# Patient Record
(demographics unavailable — no encounter records)

---

## 2024-11-13 NOTE — DISCUSSION/SUMMARY
[de-identified] : Left knee mild osteoarthritis history of meniscus tear 4 years status post arthroscopy with worsening of symptoms he will obtain an MRI to further assess he is in extensive conservative care with physical therapy and anti-inflammatory medications for greater than 6 weeks he will follow-up after the MRI

## 2024-11-13 NOTE — PHYSICAL EXAM
[de-identified] : left knee exam  Skin: Clean, dry, intact Inspection: No obvious malalignment, no masses, no swelling, no effusion. Tenderness: + MJLT. No LJLT. No tenderness over the medial and lateral patella facets. No ttp medial/lateral epicondyle, patella tendon, tibial tubercle, pes anserinus, or proximal fibula. ROM: 0 to 130  pain with deep flexion Stability: Stable to varus, valgus, lachman testing. Negative anterior/posterior drawer. Additional tests: Negative McMurrays test, Negative patellar grind test.  Strength: 5/5 Q/H/TA/GS/EHL, no atrophy Neuro: In tact to light touch throughout in dp/sp/tib/rita/saph nerve districutions, DTR's normal Pulses: 2+ DP/PT pulses. [de-identified] : The following radiographs were ordered and read by me during this patients visit. I reviewed each radiograph in detail with the patient and discussed the findings as highlighted below.   4 views left knee were obtained today moderate arthrosis with medial joint space narrowing unchanged from prior radiographs

## 2024-11-13 NOTE — HISTORY OF PRESENT ILLNESS
[de-identified] : 54yo M s/p Left knee arthroscopy, partial medial meniscectomy, 8/23/21.  About 2 to 3 months ago start develop pain in his knee after he had a run.  He has not ran since.  Swelled up.  No specific injury.  Reports some pain medial anterior aspect of the knee worse with stairs bending.  Tried to rest, using ice.  He is doing home exercises.  Denies numbness tingling

## 2024-12-31 NOTE — HISTORY OF PRESENT ILLNESS
[de-identified] : 54yo M s/p Left knee arthroscopy, partial medial meniscectomy, 8/23/21.  About 2 to 3 months ago start develop pain in his knee after he had a run.  He has not ran since.  Swelled up.  No specific injury.  Reports some pain medial anterior aspect of the knee worse with stairs bending.  Tried to rest, using ice.  He is doing home exercises.  Denies numbness tingling

## 2024-12-31 NOTE — DISCUSSION/SUMMARY
[de-identified] : Left knee mild osteoarthritis, medial meniscus tear and focal synovitis.  I discussed the treatment of degenerative arthritis with the patient at length today. I described the spectrum of treatment from nonoperative modalities to total joint arthroplasty. Noninvasive and nonoperative treatment modalities include weight reduction, activity modification with low impact exercise, PRN use of acetaminophen or anti-inflammatory medication if tolerated, glucosamine/chondroitin supplements, and physical therapy. Further treatments can include corticosteroid injection and the use of hyaluronic acid injections. Definitive treatment can certainly include total joint arthroplasty also. The risks and benefits of each treatment options was discussed and all questions were answered.   History of meniscus tear 4 years status post arthroscopy with worsening of symptoms. He is in extensive conservative care with physical therapy and anti-inflammatory medications for greater than 6 weeks.  Injection: Left knee joint. Indication: Arthritis   A discussion was had with the patient regarding this procedure and all questions were answered. All risks, benefits and alternatives were discussed. These included but were not limited to bleeding, infection, and allergic reaction. Alcohol was used to clean the skin, and betadine was used to sterilize and prep the area in the supero-lateral aspect of the left knee. Ethyl chloride spray was then used as a topical anesthetic. A 21-gauge needle was used to inject 4cc of 1% lidocaine and 1cc of 40mg/ml methylprednisolone into the knee. A sterile bandage was then applied. The patient tolerated the procedure well and there were no complications.  Use ice, Tylenol, anti-inflammatory medication as needed.  If not improved we can discuss arthroscopy  .

## 2024-12-31 NOTE — PHYSICAL EXAM
[de-identified] : left knee exam  Skin: Clean, dry, intact Inspection: No obvious malalignment, no masses, no swelling, no effusion. Tenderness: + MJLT. No LJLT. No tenderness over the medial and lateral patella facets. No ttp medial/lateral epicondyle, patella tendon, tibial tubercle, pes anserinus, or proximal fibula. ROM: 0 to 130  pain with deep flexion Stability: Stable to varus, valgus, lachman testing. Negative anterior/posterior drawer. Additional tests: Negative McMurrays test, Negative patellar grind test.  Strength: 5/5 Q/H/TA/GS/EHL, no atrophy Neuro: In tact to light touch throughout in dp/sp/tib/rita/saph nerve districutions, DTR's normal Pulses: 2+ DP/PT pulses. [de-identified] : MRI reviewed left knee complex tearing of posterior horn and root of the medial meniscus there is an ovoid shaped scar granulation tissue in the anterior aspect of the intercondylar notch.  Mild arthritis